# Patient Record
(demographics unavailable — no encounter records)

---

## 2025-02-27 NOTE — PHYSICAL EXAM
[Alert] : alert [Well Nourished] : well nourished [No Acute Distress] : no acute distress [Well Developed] : well developed [Normal Sclera/Conjunctiva] : normal sclera/conjunctiva [EOMI] : extra ocular movement intact [No Proptosis] : no proptosis [Normal Oropharynx] : the oropharynx was normal [Thyroid Not Enlarged] : the thyroid was not enlarged [No Thyroid Nodules] : no palpable thyroid nodules [No Respiratory Distress] : no respiratory distress [No Accessory Muscle Use] : no accessory muscle use [Clear to Auscultation] : lungs were clear to auscultation bilaterally [Normal S1, S2] : normal S1 and S2 [Normal Rate] : heart rate was normal [Regular Rhythm] : with a regular rhythm [No Edema] : no peripheral edema [Pedal Pulses Normal] : the pedal pulses are present [Normal Bowel Sounds] : normal bowel sounds [Not Tender] : non-tender [Not Distended] : not distended [Soft] : abdomen soft [No HSM] : no hepato-splenomegaly [Normal Anterior Cervical Nodes] : no anterior cervical lymphadenopathy [Normal Posterior Cervical Nodes] : no posterior cervical lymphadenopathy [No CVA Tenderness] : no ~M costovertebral angle tenderness [No Spinal Tenderness] : no spinal tenderness [Spine Straight] : spine straight [No Stigmata of Cushings Syndrome] : no stigmata of Cushings Syndrome [Normal Gait] : normal gait [Normal Strength/Tone] : muscle strength and tone were normal [No Rash] : no rash [No Skin Lesions] : no skin lesions [Acanthosis Nigricans] : no acanthosis nigricans [No Motor Deficits] : the motor exam was normal [No Sensory Deficits] : the sensory exam was normal to light touch and pinprick [Normal Reflexes] : deep tendon reflexes were 2+ and symmetric [No Tremors] : no tremors [Oriented x3] : oriented to person, place, and time [de-identified] : Patient BMI is at 24.7 and his weight is 158 pounds.  His previous weight was 174 pounds.

## 2025-02-27 NOTE — REVIEW OF SYSTEMS
[Fatigue] : fatigue [Decreased Appetite] : decreased appetite [Recent Weight Loss (___ Lbs)] : recent weight loss: [unfilled] lbs [Negative] : Heme/Lymph

## 2025-02-27 NOTE — ASSESSMENT
[Diabetes Foot Care] : diabetes foot care [Long Term Vascular Complications] : long term vascular complications of diabetes [Carbohydrate Consistent Diet] : carbohydrate consistent diet [Importance of Diet and Exercise] : importance of diet and exercise to improve glycemic control, achieve weight loss and improve cardiovascular health [Exercise/Effect on Glucose] : exercise/effect on glucose [Hypoglycemia Management] : hypoglycemia management [Self Monitoring of Blood Glucose] : self monitoring of blood glucose [Retinopathy Screening] : Patient was referred to ophthalmology for retinopathy screening [FreeTextEntry1] : Elderly white male who has a past medical history of type 2 diabetes and primary hypothyroidism currently maintained on medication.  Patient is well compliant with his diet but has not been exercising on a regular basis.  Patient had a blood test done on 24 January 2025 and his complete metabolic panel was normal urine for microalbumin was 1.5 and the microalbumin/creatinine ratio was 15, total cholesterol of 150 and LDL of 80 and his hemoglobin A1c was 6.4%.  Patient with stable glycemic control, well compliant with his diet but has not been exercising on a regular basis and also reports a mild weight loss due to recent stress.  Recommendation 1.  Patient is advised to continue with the Janumet 50/1000 mg 1 tablet daily and Jardiance 10 mg tablets daily.  Also he will continue with the levothyroxine 50 mcg tablets daily. 2.  Patient will continue with the baby aspirin, losartan with hydrochlorothiazide and simvastatin 40 mg daily. 3.  The importance of a strict low carbohydrate and low-fat diet was the patient and the benefits of discussed andmoderate walking and exercise was also discussed with the patient 4.  Hypoglycemic symptoms were explained to the patient together with the management and regular footcare 5.  If the condition remains stable he will return to the office in 4 months time with a repeat blood panel.  The plan was discussed with the patient thank you

## 2025-02-27 NOTE — HISTORY OF PRESENT ILLNESS
[FreeTextEntry1] : 87-year-old white male who has a past medical history of type 2 diabetes, hyperlipidemia, hypothyroidism and hypertension presents for routine follow-up.  Patient is currently taking Janumet 50/1000 mg 1 tablet daily, Jardiance 10 mg daily, levothyroxine 50 mcg tablets daily.  According to the patient his glucose levels have been ranging between 110 to 130 mg per DL.  Patient recently lost some weight due to stressful situations.  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  He denies any chest pain shortness of breath, nausea vomiting or dysuria.  He is well compliant with his diet.  Physically he is not very active and does a limited amount of walking.  He denies any numbness of extremities or any heartburn.  There is no history of any hypoglycemia or any neuropathy.

## 2025-07-18 NOTE — HISTORY OF PRESENT ILLNESS
[FreeTextEntry1] : 88-year-old male with a medical hx of DM2 present for a routine follow up visit. Patient does have a past hx of HTN, hyperlipidemia, hypothyroidism.  His current medications include aspirin, Janumet, Jardiance, Levothyroxine, valsartan simvastatin. Patient is compliant with checking his glucose levels once a day in the morning with numbers that range from 100-120mg dL. He denies any significant symptoms of chest pain, sob, abdominal pain, nausea or vomiting. Patient is compliant with his diet; weight has been stable. Circulation of the lower extremities remains stable bilateral, denies any paresthesia. He has not noticed any changes in his vision. Patient denies any polyuria or nocturia. Patient recently did have blood work 7/11/25 shows an A1C of 6.1. He is quite active and walks daily. Patient remains clinically stable and will follow up with labs.

## 2025-07-18 NOTE — ASSESSMENT
[Diabetes Foot Care] : diabetes foot care [Long Term Vascular Complications] : long term vascular complications of diabetes [Carbohydrate Consistent Diet] : carbohydrate consistent diet [Exercise/Effect on Glucose] : exercise/effect on glucose [Hypoglycemia Management] : hypoglycemia management [Self Monitoring of Blood Glucose] : self monitoring of blood glucose [Retinopathy Screening] : Patient was referred to ophthalmology for retinopathy screening [FreeTextEntry1] : Pleasant elderly white male with a past medical history of type 2 diabetes with hyperlipidemia and hypertension presents for routine follow-up.  Patient is tolerating the medications well without any side effects.  He recently had a blood test which showed hemoglobin A1c to be 6.1%, TSH level is 2.08, complete metabolic panel is normal and the lipid panel shows an LDL of 78 and total cholesterol of 154 mg per DL.  His urine analysis urine culture.  There was less amount of ketones.  My clinical impression is that the patient is compliant with his diet and exercise regimen and is tolerating the medications well without any side effects.  Recommendation 1.  Patient will continue with the Janumet 50/1000 mg 1 daily and Jardiance 10 mg daily and also he takes levothyroxine 50 mcg tablets daily. 2.  Patient will continue with the simvastatin 40 mg daily and valsartan which was recently changed by the cardiologist. 3.  The importance of strict low carbohydrate and low-fat diet together with the moderate exercise was discussed with the patient. 4.  If the patient remains clinically stable he will follow-up in 6 months time with a repeat blood test.  The plan was discussed with the patient in detail.  Thank you

## 2025-07-18 NOTE — PHYSICAL EXAM
[Alert] : alert [Well Nourished] : well nourished [No Acute Distress] : no acute distress [Well Developed] : well developed [Normal Sclera/Conjunctiva] : normal sclera/conjunctiva [EOMI] : extra ocular movement intact [No Proptosis] : no proptosis [Normal Oropharynx] : the oropharynx was normal [Thyroid Not Enlarged] : the thyroid was not enlarged [No Thyroid Nodules] : no palpable thyroid nodules [No Respiratory Distress] : no respiratory distress [No Accessory Muscle Use] : no accessory muscle use [Clear to Auscultation] : lungs were clear to auscultation bilaterally [Normal S1, S2] : normal S1 and S2 [Normal Rate] : heart rate was normal [Regular Rhythm] : with a regular rhythm [No Edema] : no peripheral edema [Pedal Pulses Normal] : the pedal pulses are present [Normal Bowel Sounds] : normal bowel sounds [Not Tender] : non-tender [Not Distended] : not distended [Soft] : abdomen soft [Normal Anterior Cervical Nodes] : no anterior cervical lymphadenopathy [Normal Posterior Cervical Nodes] : no posterior cervical lymphadenopathy [No Spinal Tenderness] : no spinal tenderness [Spine Straight] : spine straight [No Stigmata of Cushings Syndrome] : no stigmata of Cushings Syndrome [Normal Gait] : normal gait [Normal Strength/Tone] : muscle strength and tone were normal [No Rash] : no rash [Acanthosis Nigricans] : no acanthosis nigricans [No Motor Deficits] : the motor exam was normal [No Sensory Deficits] : the sensory exam was normal to light touch and pinprick [Normal Reflexes] : deep tendon reflexes were 2+ and symmetric [No Tremors] : no tremors [Oriented x3] : oriented to person, place, and time [de-identified] : Patient BMI is 25.37 he is weight is 162 pounds